# Patient Record
Sex: MALE | Race: AMERICAN INDIAN OR ALASKA NATIVE
[De-identification: names, ages, dates, MRNs, and addresses within clinical notes are randomized per-mention and may not be internally consistent; named-entity substitution may affect disease eponyms.]

---

## 2019-04-28 ENCOUNTER — HOSPITAL ENCOUNTER (EMERGENCY)
Dept: HOSPITAL 5 - ED | Age: 19
Discharge: HOME | End: 2019-04-28
Payer: COMMERCIAL

## 2019-04-28 VITALS — SYSTOLIC BLOOD PRESSURE: 114 MMHG | DIASTOLIC BLOOD PRESSURE: 59 MMHG
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
## 2019-04-28 DIAGNOSIS — S01.112A: Primary | ICD-10-CM
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
## 2019-04-28 PROCEDURE — 99282 EMERGENCY DEPT VISIT SF MDM: CPT

## 2019-04-28 NOTE — EMERGENCY DEPARTMENT REPORT
ED Head Injury/Laceration HPI





- HPI


Mechanism: Fall


Location: Facial


Pain: Mild


Tetanus Status: Up to Date


Symptoms: Loss of Consciousness: No, Nausea: No, Unusual Behavior: No, Headache:

No, Break in Skin: Yes, Bleeding: Yes


Other History: 19-year-old -American male was riding a skateboard when he

lost his balance and fell forward despite the in the ground and followed by his 

head striking the ground as well.  There was no loss of consciousness or loss of

vision, although he did report having some mild blurred vision following the 

incident.  No neck pain, no chest pain or shortness of breath.  All symptoms 

have resolved.  With the exception of of the laceration which was sustained due 

to the fall. He presents to the emergency department for treatment.





ED General PMH





- Social History


Smoking Status: Never Smoker





ED Review of Systems


ROS: 


Stated complaint: LACERATION TO TOP OF RT EYE


Other details as noted in HPI





Constitutional: denies: chills, fever


Eyes: denies: eye pain, eye discharge, vision change


ENT: denies: ear pain, throat pain


Respiratory: denies: cough, shortness of breath, wheezing


Cardiovascular: denies: chest pain, palpitations


Endocrine: no symptoms reported


Gastrointestinal: denies: abdominal pain, nausea, diarrhea


Genitourinary: denies: urgency, dysuria


Musculoskeletal: denies: back pain, joint swelling, arthralgia


Skin: denies: rash, lesions


Neurological: denies: headache, weakness, paresthesias


Psychiatric: denies: anxiety, depression


Hematological/Lymphatic: denies: easy bleeding, easy bruising





Head Inj w/lac Physical Exam





- Exam


General: 


Vital signs noted. No distress. Alert and acting appropriately.





Adult Head Front + Back: 


                            __________________________














                            __________________________





 1 - Laceration





 2 - Small area of swelling and superficial abrasion noted





Head: Yes PERRL, No Hemotympanum, No Hematoma/Ecchymosis, No Epistaxis, No S

tepoff/Deformity, No Abrasion, No Foreign Body


Wound Length (cm): 2


Laceration Location: Frontal, Other (to the Left brow)


Chest, Abd, & Ext: Yes Clear Lung Sounds, Yes Regular Heart Rhythm, No Neck 

Pain, No Chest Injury/Pain, No Heart Murmur, No Abdominal Tenderness, No Back 

Tenderness, No Extremity Injury


Neuroligical (Head Inj W/O Lac: Yes Normal Speech, Yes Normal Gait, No Lethargy,

No Disorientation, No Focal Numbness, No Focal Weakness





ED Disposition


Clinical Impression: 


 Simple laceration of face





Disposition: DC-01 TO HOME OR SELFCARE


Is pt being admited?: No


Does the pt Need Aspirin: No


Condition: Stable


Instructions:  Skin Adhesive Care (ED)


Referrals: 


PRIMARY CARE,MD [Primary Care Provider] - 3-5 Days

## 2019-04-28 NOTE — EMERGENCY DEPARTMENT REPORT
Blank Doc





- Documentation


Documentation: 





20 y/o fall while skate boarding. Comes in  with s open wound to left side of 

face 30min PTA. No PMH.